# Patient Record
(demographics unavailable — no encounter records)

---

## 2017-10-02 NOTE — RAD
CHEST TWO VIEWS

10/02/2017

 

Comparison is made with an older study of 10/02/2008 done at [facility].

 

The heart is normal in size, and the lungs are clear.  No infiltrate or effusion was seen to suggest
 pneumonia.  There is no vascular congestion or edema.  The trachea is midline.  

 

IMPRESSION:   

No acute thoracic findings.

 

POS: HOME